# Patient Record
Sex: FEMALE | Race: BLACK OR AFRICAN AMERICAN | NOT HISPANIC OR LATINO | Employment: FULL TIME | URBAN - METROPOLITAN AREA
[De-identification: names, ages, dates, MRNs, and addresses within clinical notes are randomized per-mention and may not be internally consistent; named-entity substitution may affect disease eponyms.]

---

## 2020-09-14 PROBLEM — F31.70 BIPOLAR AFFECTIVE DISORDER IN REMISSION: Status: ACTIVE | Noted: 2019-11-17

## 2020-09-14 PROBLEM — D50.9 IRON DEFICIENCY ANEMIA: Status: ACTIVE | Noted: 2019-11-18

## 2020-09-14 PROBLEM — G47.33 OBSTRUCTIVE SLEEP APNEA: Status: ACTIVE | Noted: 2019-01-08

## 2020-09-14 PROBLEM — N93.9 ABNORMAL UTERINE BLEEDING: Status: ACTIVE | Noted: 2019-11-16

## 2020-09-14 PROBLEM — F33.42 RECURRENT MAJOR DEPRESSIVE DISORDER, IN FULL REMISSION: Status: ACTIVE | Noted: 2019-11-17

## 2021-05-12 ENCOUNTER — PATIENT MESSAGE (OUTPATIENT)
Dept: RESEARCH | Facility: HOSPITAL | Age: 26
End: 2021-05-12

## 2021-08-25 PROBLEM — R73.03 PREDIABETES: Status: ACTIVE | Noted: 2021-08-25

## 2021-08-26 PROBLEM — M54.9 CHRONIC BACK PAIN: Status: ACTIVE | Noted: 2021-08-26

## 2021-08-26 PROBLEM — Z91.02 ALLERGY TO FOOD DYE: Status: ACTIVE | Noted: 2021-08-26

## 2021-08-26 PROBLEM — D50.9 IRON DEFICIENCY ANEMIA: Chronic | Status: ACTIVE | Noted: 2019-11-18

## 2021-08-26 PROBLEM — F31.70 BIPOLAR AFFECTIVE DISORDER IN REMISSION: Chronic | Status: ACTIVE | Noted: 2019-11-17

## 2021-08-26 PROBLEM — R73.03 PREDIABETES: Chronic | Status: ACTIVE | Noted: 2021-08-25

## 2021-08-26 PROBLEM — G89.29 CHRONIC BACK PAIN: Status: ACTIVE | Noted: 2021-08-26

## 2021-08-26 PROBLEM — F33.42 RECURRENT MAJOR DEPRESSIVE DISORDER, IN FULL REMISSION: Chronic | Status: ACTIVE | Noted: 2019-11-17

## 2021-09-10 PROBLEM — F43.10 PTSD (POST-TRAUMATIC STRESS DISORDER): Status: ACTIVE | Noted: 2021-09-10

## 2021-09-24 PROBLEM — Z91.02 ALLERGY TO FOOD DYE: Status: RESOLVED | Noted: 2021-08-26 | Resolved: 2021-09-24

## 2021-09-24 PROBLEM — L50.1 CHRONIC IDIOPATHIC URTICARIA: Status: ACTIVE | Noted: 2021-09-24

## 2021-09-26 PROBLEM — L50.3 DERMATOGRAPHISM: Status: ACTIVE | Noted: 2021-09-26

## 2021-12-26 ENCOUNTER — PATIENT MESSAGE (OUTPATIENT)
Dept: ADMINISTRATIVE | Facility: OTHER | Age: 26
End: 2021-12-26

## 2022-02-27 PROBLEM — Z00.00 HEALTHCARE MAINTENANCE: Status: ACTIVE | Noted: 2022-02-27

## 2022-02-27 PROBLEM — G47.00 INSOMNIA: Chronic | Status: ACTIVE | Noted: 2022-02-27

## 2022-03-04 PROBLEM — Z91.199 NON-ADHERENCE TO MEDICAL TREATMENT: Chronic | Status: ACTIVE | Noted: 2022-03-04

## 2022-03-04 PROBLEM — F43.10 PTSD (POST-TRAUMATIC STRESS DISORDER): Chronic | Status: ACTIVE | Noted: 2021-09-10

## 2022-03-04 PROBLEM — G47.33 OBSTRUCTIVE SLEEP APNEA: Chronic | Status: ACTIVE | Noted: 2019-01-08

## 2022-03-04 PROBLEM — F31.9 BIPOLAR I DISORDER: Status: ACTIVE | Noted: 2019-11-17

## 2022-03-04 PROBLEM — Z91.199 NON-ADHERENCE TO MEDICAL TREATMENT: Status: ACTIVE | Noted: 2022-03-04

## 2022-03-04 PROBLEM — F31.9 BIPOLAR I DISORDER: Chronic | Status: ACTIVE | Noted: 2019-11-17

## 2022-03-04 PROBLEM — Z91.89 AT RISK FOR SLEEP APNEA: Status: ACTIVE | Noted: 2022-03-04

## 2022-03-04 PROBLEM — G47.00 INSOMNIA: Status: ACTIVE | Noted: 2022-02-27

## 2022-03-04 PROBLEM — Z91.89 AT RISK FOR SLEEP APNEA: Chronic | Status: ACTIVE | Noted: 2022-03-04

## 2022-03-07 PROBLEM — F12.10 CANNABIS ABUSE: Status: ACTIVE | Noted: 2022-03-07

## 2022-03-07 PROBLEM — F12.10 CANNABIS ABUSE: Chronic | Status: ACTIVE | Noted: 2022-03-07

## 2022-05-30 PROBLEM — Z00.00 HEALTHCARE MAINTENANCE: Status: RESOLVED | Noted: 2022-02-27 | Resolved: 2022-05-30

## 2022-06-05 PROBLEM — N92.1 MENORRHAGIA WITH IRREGULAR CYCLE: Status: ACTIVE | Noted: 2022-06-05

## 2022-07-07 PROBLEM — N62 MACROMASTIA: Status: ACTIVE | Noted: 2022-07-07

## 2022-08-01 PROBLEM — J45.20 MILD INTERMITTENT ASTHMA WITHOUT COMPLICATION: Chronic | Status: ACTIVE | Noted: 2022-08-01

## 2022-09-15 PROBLEM — Z30.42 DEPO-PROVERA CONTRACEPTIVE STATUS: Status: ACTIVE | Noted: 2022-09-15

## 2022-10-03 PROBLEM — F31.4 BIPOLAR I DISORDER, MOST RECENT EPISODE DEPRESSED, SEVERE WITHOUT PSYCHOTIC FEATURES: Status: ACTIVE | Noted: 2019-11-17

## 2023-03-13 PROBLEM — F66 GENDER IDENTITY UNCERTAINTY IN ADULT: Status: ACTIVE | Noted: 2023-03-13

## 2023-05-03 ENCOUNTER — NURSE TRIAGE (OUTPATIENT)
Dept: ADMINISTRATIVE | Facility: CLINIC | Age: 28
End: 2023-05-03

## 2023-05-03 NOTE — TELEPHONE ENCOUNTER
Pt states that she is post op Breast reduction 4/13/23. No c/o pain to right breast 8-9/10 since yesterday. States that pain has not improved with Ibuprofen. Dr. Bryant bell per protocol. Dr. Hurtado provided with pt contact information for f/u call. Pt made aware. Verbalized understanding. Encounter routed to provider.       Reason for Disposition   [1] SEVERE post-op pain (e.g., excruciating, pain scale 8-10) AND [2] not controlled with pain medications    Additional Information   Negative: Sounds like a life-threatening emergency to the triager   Negative: [1] Widespread rash AND [2] bright red, sunburn-like   Negative: [1] SEVERE headache AND [2] after spinal (epidural) anesthesia   Negative: [1] Vomiting AND [2] persists > 4 hours   Negative: [1] Vomiting AND [2] abdomen looks much more swollen than usual   Negative: [1] Drinking very little AND [2] dehydration suspected (e.g., no urine > 12 hours, very dry mouth, very lightheaded)   Negative: Patient sounds very sick or weak to the triager   Negative: Sounds like a serious complication to the triager   Negative: Fever > 100.4 F (38.0 C)    Protocols used: Post-Op Symptoms and [id number]

## 2023-08-28 PROBLEM — N92.1 MENORRHAGIA WITH IRREGULAR CYCLE: Chronic | Status: ACTIVE | Noted: 2022-06-05

## 2023-08-28 PROBLEM — E11.9 TYPE 2 DIABETES MELLITUS WITHOUT COMPLICATION, WITHOUT LONG-TERM CURRENT USE OF INSULIN: Chronic | Status: ACTIVE | Noted: 2023-08-28

## 2023-11-16 ENCOUNTER — PATIENT MESSAGE (OUTPATIENT)
Dept: ADMINISTRATIVE | Facility: HOSPITAL | Age: 28
End: 2023-11-16

## 2023-11-20 PROBLEM — Z23 NEED FOR INFLUENZA VACCINATION: Status: ACTIVE | Noted: 2023-11-20

## 2023-11-20 PROBLEM — Z30.9 ENCOUNTER FOR CONTRACEPTIVE MANAGEMENT: Status: ACTIVE | Noted: 2023-11-20

## 2024-02-12 ENCOUNTER — PATIENT OUTREACH (OUTPATIENT)
Dept: ADMINISTRATIVE | Facility: HOSPITAL | Age: 29
End: 2024-02-12

## 2024-02-12 DIAGNOSIS — E11.9 TYPE 2 DIABETES MELLITUS WITHOUT COMPLICATION, WITHOUT LONG-TERM CURRENT USE OF INSULIN: Primary | Chronic | ICD-10-CM

## 2024-02-16 PROBLEM — G44.229 CHRONIC TENSION-TYPE HEADACHE, NOT INTRACTABLE: Status: ACTIVE | Noted: 2024-02-16

## 2024-02-16 PROBLEM — R60.0 EDEMA OF BOTH LOWER EXTREMITIES: Status: ACTIVE | Noted: 2024-02-16

## 2024-02-16 PROBLEM — M79.89 MASS OF SOFT TISSUE OF NECK: Status: ACTIVE | Noted: 2024-02-16

## 2024-05-20 PROBLEM — Z00.00 HEALTHCARE MAINTENANCE: Status: RESOLVED | Noted: 2022-02-27 | Resolved: 2024-05-20

## 2024-06-20 PROBLEM — R53.83 FATIGUE: Status: ACTIVE | Noted: 2024-06-20

## 2024-06-20 PROBLEM — F20.5: Status: ACTIVE | Noted: 2024-06-20

## 2024-06-20 PROBLEM — F31.9 BIPOLAR I DISORDER: Status: ACTIVE | Noted: 2024-06-20

## 2024-06-20 PROBLEM — Z00.00 HEALTHCARE MAINTENANCE: Status: ACTIVE | Noted: 2024-06-20

## 2024-09-23 DIAGNOSIS — E11.9 TYPE 2 DIABETES MELLITUS WITHOUT COMPLICATION: ICD-10-CM

## 2024-09-23 PROBLEM — Z00.00 HEALTHCARE MAINTENANCE: Status: RESOLVED | Noted: 2024-06-20 | Resolved: 2024-09-23

## 2024-09-25 ENCOUNTER — PATIENT MESSAGE (OUTPATIENT)
Dept: ADMINISTRATIVE | Facility: HOSPITAL | Age: 29
End: 2024-09-25

## 2024-09-27 PROBLEM — Z28.21 INFLUENZA VACCINATION DECLINED: Status: ACTIVE | Noted: 2024-09-27

## 2024-09-27 PROBLEM — M22.2X2 PATELLOFEMORAL SYNDROME OF LEFT KNEE: Status: ACTIVE | Noted: 2024-09-27

## 2024-09-28 PROBLEM — E66.9 CLASS 2 OBESITY WITH BODY MASS INDEX (BMI) OF 37.0 TO 37.9 IN ADULT: Status: ACTIVE | Noted: 2024-09-28

## 2024-09-28 PROBLEM — E66.812 CLASS 2 OBESITY WITH BODY MASS INDEX (BMI) OF 37.0 TO 37.9 IN ADULT: Status: ACTIVE | Noted: 2024-09-28

## 2024-09-28 PROBLEM — R06.83 SNORES: Chronic | Status: ACTIVE | Noted: 2024-09-28

## 2024-10-12 PROBLEM — S83.001A PATELLAR SUBLUXATION, RIGHT, INITIAL ENCOUNTER: Status: ACTIVE | Noted: 2024-10-12
